# Patient Record
Sex: FEMALE | Race: WHITE | NOT HISPANIC OR LATINO
[De-identification: names, ages, dates, MRNs, and addresses within clinical notes are randomized per-mention and may not be internally consistent; named-entity substitution may affect disease eponyms.]

---

## 2021-08-06 PROBLEM — Z00.00 ENCOUNTER FOR PREVENTIVE HEALTH EXAMINATION: Status: ACTIVE | Noted: 2021-08-06

## 2021-08-19 ENCOUNTER — APPOINTMENT (OUTPATIENT)
Dept: BREAST CENTER | Facility: CLINIC | Age: 76
End: 2021-08-19
Payer: MEDICARE

## 2021-08-19 ENCOUNTER — NON-APPOINTMENT (OUTPATIENT)
Age: 76
End: 2021-08-19

## 2021-08-19 VITALS
SYSTOLIC BLOOD PRESSURE: 158 MMHG | OXYGEN SATURATION: 98 % | BODY MASS INDEX: 10.67 KG/M2 | DIASTOLIC BLOOD PRESSURE: 80 MMHG | HEIGHT: 67 IN | WEIGHT: 68 LBS | HEART RATE: 68 BPM

## 2021-08-19 DIAGNOSIS — Z80.3 FAMILY HISTORY OF MALIGNANT NEOPLASM OF BREAST: ICD-10-CM

## 2021-08-19 DIAGNOSIS — Z86.39 PERSONAL HISTORY OF OTHER ENDOCRINE, NUTRITIONAL AND METABOLIC DISEASE: ICD-10-CM

## 2021-08-19 DIAGNOSIS — Z87.891 PERSONAL HISTORY OF NICOTINE DEPENDENCE: ICD-10-CM

## 2021-08-19 DIAGNOSIS — Z90.12 ACQUIRED ABSENCE OF LEFT BREAST AND NIPPLE: ICD-10-CM

## 2021-08-19 DIAGNOSIS — Z85.3 PERSONAL HISTORY OF MALIGNANT NEOPLASM OF BREAST: ICD-10-CM

## 2021-08-19 DIAGNOSIS — Z86.79 PERSONAL HISTORY OF OTHER DISEASES OF THE CIRCULATORY SYSTEM: ICD-10-CM

## 2021-08-19 DIAGNOSIS — Z78.9 OTHER SPECIFIED HEALTH STATUS: ICD-10-CM

## 2021-08-19 DIAGNOSIS — R92.2 INCONCLUSIVE MAMMOGRAM: ICD-10-CM

## 2021-08-19 DIAGNOSIS — Z12.39 ENCOUNTER FOR OTHER SCREENING FOR MALIGNANT NEOPLASM OF BREAST: ICD-10-CM

## 2021-08-19 PROCEDURE — 99203 OFFICE O/P NEW LOW 30 MIN: CPT

## 2021-08-19 RX ORDER — ATORVASTATIN CALCIUM 80 MG/1
TABLET, FILM COATED ORAL
Refills: 0 | Status: ACTIVE | COMMUNITY

## 2021-08-19 RX ORDER — ENALAPRIL MALEATE 2.5 MG/1
2.5 TABLET ORAL
Refills: 0 | Status: ACTIVE | COMMUNITY

## 2021-08-19 NOTE — ASSESSMENT
[FreeTextEntry1] : The patient is a 76-year-old  postmenopausal white female of [place] descent.  She underwent menarche at age 15 and had her first child at age 21.  She never took any hormone replacement therapy.  She has a strong family history with 2 sisters who had breast cancer in their 70s and her maternal aunt had breast cancer in her 70s.  The patient herself was diagnosed with a left breast cancer in  and underwent a total mastectomy with axillary lymph node dissection at [facility] in the [place] and took tamoxifen for 5 years.  She had an expander reconstruction later exchanged for an implant with a right breast reduction mastopexy. On exam today I cannot feel any evidence of recurrence of the left implant and no suspicious findings in the right breast.  She underwent her last right breast mammography and ultrasound in 2021 at [facility] in the [place].  This was reportedly unchanged and negative.  She had had some recent pain over the right implant but this sounds like it was secondary to a musculoskeletal injury which has since resolved.  She was reassured that she has no suspicious findings on exam today.  She can come in for yearly follow-up and her next right breast mammography and ultrasound will be due in 2022.

## 2021-08-19 NOTE — PAST MEDICAL HISTORY
[Menarche Age ____] : age at menarche was [unfilled] [Approximately ___] : the LMP was approximately [unfilled] [Total Preg ___] : G[unfilled] [Live Births ___] : P[unfilled]  [Age At Live Birth ___] : Age at live birth: [unfilled] [History of Hormone Replacement Treatment] : has no history of hormone replacement treatment

## 2021-08-19 NOTE — REASON FOR VISIT
[Initial Evaluation] : an initial evaluation [FreeTextEntry1] : The patient comes in today to establish care with a strong family history of breast cancer and a personal history of a left breast mastectomy performed in 1994 for breast cancer for which she received tamoxifen postoperatively for 5 years.  She now comes in to just establish care.

## 2021-08-19 NOTE — PHYSICAL EXAM
[Atraumatic] : atraumatic [Normocephalic] : normocephalic [EOMI] : extra ocular movement intact [Supple] : supple [No Supraclavicular Adenopathy] : no supraclavicular adenopathy [No Cervical Adenopathy] : no cervical adenopathy [Examined in the supine and seated position] : examined in the supine and seated position [No dominant masses] : no dominant masses in right breast  [No dominant masses] : no dominant masses left breast [No Nipple Retraction] : no right nipple retraction [No Nipple Discharge] : no right nipple discharge [Breast Nipple Inversion Right] : nipple not inverted [Breast Nipple Retraction Right] : nipple not retracted [Breast Nipple Flattening Right] : nipple not flattened [Breast Nipple Fissures Right] : nipple not fissured [Breast Abnormal Lactation (Galactorrhea) Right] : no galactorrhea [Breast Abnormal Secretion Bloody Fluid Right] : no bloody discharge [Breast Abnormal Secretion Serous Fluid Right] : no serous discharge [Breast Abnormal Secretion Opalescent Fluid Right] : no milky discharge [Breast Mass Left Breast ___cm] : no masses [Breast Mass Right Breast ___cm] : no masses [No Axillary Lymphadenopathy] : no left axillary lymphadenopathy [No Edema] : no edema [No Rashes] : no rashes [No Ulceration] : no ulceration [de-identified] : On exam, the patient has the obvious left breast mastectomy with an implant reconstruction.  She has a ptotic B-cup right breast and had a previous right breast reduction mastopexy for symmetry.  I cannot feel any evidence of recurrence over the left implant that no suspicious findings in the right breast.  She has no axillary, supraclavicular, or cervical adenopathy. [de-identified] : Status post total mastectomy with implant reconstruction with no evidence of recurrence

## 2021-08-19 NOTE — HISTORY OF PRESENT ILLNESS
[FreeTextEntry1] : The patient is a 76-year-old  postmenopausal white female of [place] descent.  She underwent menarche at age 15 and had her first child at age 21.  She never took any hormone replacement therapy.  She has a strong family history with 2 sisters who had breast cancer in their 70s and her maternal aunt had breast cancer in her 70s.  The patient herself was diagnosed with a left breast cancer in  and underwent a total mastectomy with axillary lymph node dissection at [facility] in the [place] and took tamoxifen for 5 years.  She had an expander reconstruction later exchanged for an implant with a right breast reduction mastopexy.  She has been doing well and has been getting routine yearly right breast mammography and ultrasound.  She comes in now to establish routine follow-up.